# Patient Record
(demographics unavailable — no encounter records)

---

## 2024-10-23 NOTE — HISTORY OF PRESENT ILLNESS
[de-identified] : 38 year old F with PMH anxiety, food allergy, and HLD presents for CPE. Pt denies CP/SOB, fever/chills, n/v/d/c.

## 2024-10-23 NOTE — PLAN
[FreeTextEntry1] : Routine blood work drawn in office and urine collected today. Declines flu. Refill meds. Refer to orthopedics. Pt advised to sign up for [facility] portal to review labs and communicate any questions or concerns directly. Yearly physical and return as needed for illness, medication refills, and new or existing complaints.

## 2024-10-23 NOTE — HEALTH RISK ASSESSMENT
[Good] : ~his/her~  mood as  good [Yes] : Yes [Monthly or less (1 pt)] : Monthly or less (1 point) [1 or 2 (0 pts)] : 1 or 2 (0 points) [Never (0 pts)] : Never (0 points) [No] : In the past 12 months have you used drugs other than those required for medical reasons? No [0] : 2) Feeling down, depressed, or hopeless: Not at all (0) [PHQ-2 Negative - No further assessment needed] : PHQ-2 Negative - No further assessment needed [Never] : Never [NO] : No [Patient reported PAP Smear was normal] : Patient reported PAP Smear was normal [None] : None [With Family] : lives with family [] :  [# Of Children ___] : has [unfilled] children [Audit-CScore] : 1 [[id number]] : 0

## 2024-10-30 NOTE — HISTORY OF PRESENT ILLNESS
[de-identified] : 38 F, PMH GERD (omeprazole) presents with 1 year of left sided pain along left iliac crest She states that this stareted around the time that she started a new workout class of dance workout, she attributed this pain to this however after stopping class pain did not resolve Pain is intermittent but occurs most days, pain is worse at night and with lying in certain positions also when bending her trunk to the right side This is her first evaluation for this She does not take any pain medication for this typically

## 2024-10-30 NOTE — DISCUSSION/SUMMARY
[de-identified] : Patient was seen today for evaluation management of 1 year of left hip/groin pain.  Patient has no pain with palpation, no reproducible pain with muscle testing, it is unclear what is exactly the etiology of his chronic intermittent pain.  Patient may be having some hip flexor strain/tendinitis, however there is no weakness and no specific reproduction of pain.  Patient was advised that this could potentially be something more intra-abdominal or within the pelvis, she has history of  section 4 years ago, but has regular follow-up with her GYN, she recently had full GYN annual exam with associated ultrasound as she did complain of this pain, she was told everything was normal.  At this time recommend a course of physical therapy 2 times per week for 4 weeks to see if we can make some interval improvement in her condition.  If no improvement may also consider GI consultation at that time for possible GI etiology within the lower abdomen/pelvis.  If patient has persisting pain may consider MRI at that time, but at this time patient has no evidence of any structural abnormality, certainly no surgical indications, recommend deferral of imaging at this time.  Follow up as needed.  Patient appreciates and agrees with current plan.  This note was generated using dragon medical dictation software.  A reasonable effort has been made for proofreading its contents, but typos may still remain.  If there are any questions or points of clarification needed please notify my office.

## 2024-10-30 NOTE — PHYSICAL EXAM
[de-identified] : Constitutional: Well-nourished, well-developed, No acute distress Respiratory:  Good respiratory effort, no SOB Lymphatic: No regional lymphadenopathy, no lymphedema Psychiatric: Pleasant and normal affect, alert and oriented x3 Musculoskeletal: normal except where as noted in regional exam  Left hip:  APPEARANCE: no marked deformities, no swelling or malalignment POSITIVE TENDERNESS: none NONTENDER: greater trochanter, TFL, gluteal region, ischium/proximal hamstring region, hip flexor region, sartorius and pubic symphysis.  ROM: full & painless.  RESISTIVE TESTING: painless resisted flex/ext, ER/IR/SLR/abduction/adduction.  SPECIAL TESTS: neg LALA/FADIR, painless loaded flexion & scouring  [de-identified] :  The following radiographs were ordered and read by me during this patient's visit. I reviewed each radiograph in detail with the patient and discussed the findings as highlighted below.   3 views of the left hip were obtained today that show no fracture, or dislocation. There are no degenerative changes seen. There is no malalignment. No obvious osseous abnormality. Otherwise unremarkable.

## 2024-10-30 NOTE — CONSULT LETTER
[Dear  ___] : Dear  [unfilled], [Consult Letter:] : I had the pleasure of evaluating your patient, [unfilled]. [Please see my note below.] : Please see my note below. [Consult Closing:] : Thank you very much for allowing me to participate in the care of this patient.  If you have any questions, please do not hesitate to contact me. [Sincerely,] : Sincerely, [FreeTextEntry2] : Dr. Jono Hernández [FreeTextEntry3] : Dr. Omar Kolb

## 2025-03-14 NOTE — PHYSICAL EXAM
[de-identified] : General Appearance / Station: Well developed, well nourished, in no acute distress Orientation: Oriented to person, place, and time Gait & Station: Ambulates without assistive device Neurologic: Normal leg sensation Cardiovascular: Warm extremity Lymphatics: No lymphedema Generalized Ligament Laxity: Normal Stiffness: Normal.  LUMBAR SPINE Nontender at lumbar spine Straight leg raise: Negative Motor: 5/5 motor L2-S1 Sensation Intact. No paresthesias L2-S1  SYMPTOMATIC LEFT HIP Range of motion: PAINFUL internal and external rotation of the hip. Strength: Within Normal Limits. Negative Trendelenburg sign                                                                      FADIR: POSITIVE Stinchfield: POSITIVE, with groin pain Palpation: TENDER at greater trochanter, Nontender SI joint                    LEFT KNEE Alignment: Varus Skin: Normal.  Effusion: None Quadriceps: Normal Range of motion: Normal PF crepitus: 1+ PF apprehension: None Patella / Patella Tendon: None Palpation: Nontender Meniscus signs: Negative  ASYMPTOMATIC RIGHT HIP Range of motion: Painless internal and external rotation of the hip. Skin: Normal Strength: Within normal limits LALA: Negative FADIR: Negative Stinchfield: Negative Palpation: Nontender at greater trochanter, Nontender at SI joint [de-identified] : Previous x-rays are reviewed which show no signs of bony abnormality.  CT scan of the abdomen pelvis also reviewed with normal-appearing left hip.

## 2025-03-14 NOTE — HISTORY OF PRESENT ILLNESS
[de-identified] : Patient presents for evaluation of left hip pain for the past 2 years with no associated injury.  She has undergone x-rays and CT scan with no acute pathology.  She has also undergone ultrasound evaluation to rule out gynecologic and abdominal causes for her symptoms however they have been unremarkable except for some mild reactive lymph nodes.  She continues to have pain that is 4-5 out of 10 in severity located in the groin area.

## 2025-03-14 NOTE — DISCUSSION/SUMMARY
[de-identified] : TEODORA PHIPPS  is an 38 year--old female who presents to the clinic with 2 years of left hip pain. . Exercise therapy has provided no relief.  Radiographic findings show no obvious fracture or deformity,however patient has had continued pain and is unable to return to their  usual activities. Given the history along with the physical exam findings of left hip intraarticular pain, pain with resisted hip flexion, pain with flexion, adduction and internal rotation I am concerned about a possible labral tear. I discussed that radiographs show the bones well but do not show the soft tissues, such as ligaments, tendons and labrum well. At this point, the patient is quite debilitated by her  pain and is unable to return to her  activities of daily living such as []. Given the persistent symptoms, I discussed with the patient that they should complete a structured home exercise protocol and avoid strenuous activities while we obtain an MRI to further evaluate for an internal derangement of the hip. The office will work to obtain the MRI.   I discussed with them that I often prescribe an anti-inflammatory that should be taken once a day with meals to decrease pain and expedite symptom relief. They should not take this while also taking Aleve (Naprosyn), Motrin/ Advil (Ibuprofen), Toradol (ketoralac). They must stop taking it if they develops stomach pain, increased bleeding or bruising and they should follow-up with their primary care doctor for routine blood work including kidney function to monitor its effect. While it Is not a habit-forming substance, it should only  be taken as needed and to discontinue use once symptoms have resolved.  They will continue with her over-the-counter medications that   In the interim, the patient should continue to walk and perform the structured home exercises in the packet and take anti-inflammatory medications as directed if not medically contraindicated. They will schedule follow-up for in person review of the MRI results. They know to call the office or present to the ER if they develop worsening pain, swelling, numbness, tingling, inability to use the leg.   My cumulative time spent on this patients visit included: Preparation for the visit, review of the medical records, review of pertinent diagnostic studies, examination and counseling of the patient on the above diagnosis, treatment plan and prognosis, orders of diagnostic tests, medications and/or appropriate procedures and documentation in the medical records of todays visit.

## 2025-05-16 NOTE — DISCUSSION/SUMMARY
[de-identified] : I discussed nonoperative treatment options for their nondisplaced left labral tear. We discussed nonoperative treatments options including activity modification, therapy, bracing, nonsteroidal anti-inflammatory medications, and injections.  They would like to get an additional opinion before proceeding with possible physical therapy and I will have him see my joint preservation colleague Dr. Sarkar for further evaluation.   I discussed with them that I often prescribe an anti-inflammatory that should be taken once a day with meals to decrease pain and expedite symptom relief. They should not take this while also taking Aleve (Naprosyn), Motrin/ Advil (Ibuprofen), Toradol (ketoralac). They must stop taking it if they develops stomach pain, increased bleeding or bruising and they should follow-up with their primary care doctor for routine blood work including kidney function to monitor its effect. While it Is not a habit-forming substance, it should only  be taken as needed and to discontinue use once symptoms have resolved.  Prefer to continue with over-the-counter medication   My cumulative time spent on this patients visit included: Preparation for the visit, review of the medical records, review of pertinent diagnostic studies, examination and counseling of the patient on the above diagnosis, treatment plan and prognosis, orders of diagnostic tests, medications and/or appropriate procedures and documentation in the medical records of todays visit.

## 2025-05-16 NOTE — HISTORY OF PRESENT ILLNESS
[de-identified] : Presents for follow-up of left hip pain.  Symptoms have persisted and are 4 out of 10 in severity and have been going on for over 2 years at this point but have worsened recently.  She continues to have groin pain.  She does follow-up with a OB/GYN for known ovarian cysts.

## 2025-05-16 NOTE — PHYSICAL EXAM
[de-identified] : General Appearance / Station: Well developed, well nourished, in no acute distress Orientation: Oriented to person, place, and time Gait & Station: Ambulates without assistive device Neurologic: Normal leg sensation Cardiovascular: Warm extremity Lymphatics: No lymphedema Generalized Ligament Laxity: Normal Stiffness: Normal.   SYMPTOMATIC LEFT HIP Range of motion: PAINFUL internal and external rotation of the hip. Strength: Within Normal Limits. Negative Trendelenburg sign                                                                      FADIR: POSITIVE Stinchfield: POSITIVE, with groin pain Palpation: TENDER at greater trochanter, Nontender SI joint                    LEFT KNEE Alignment: Varus Skin: Normal.  Effusion: None Quadriceps: Normal Range of motion: Normal PF crepitus: 1+ PF apprehension: None Patella / Patella Tendon: None Palpation: Nontender Meniscus signs: Negative  ASYMPTOMATIC RIGHT HIP Range of motion: Painless internal and external rotation of the hip. Skin: Normal Strength: Within normal limits LALA: Negative FADIR: Negative Stinchfield: Negative Palpation: Nontender at greater trochanter, Nontender at SI joint [de-identified] : EXAM: [id number] - MR HIP LT  - ORDERED BY: CATHIE FAYE   PROCEDURE DATE:  05/08/2025    INTERPRETATION:  History: Left hip pain.  Technique: Multiplanar and multisequence MRI images were obtained through the left hip without contrast.  Comparison: None available  Findings:   images show partially imaged T2 hyperintense lesion within the region of the right ovary/adnexa, measuring at least 2.2 cm.  OSSEOUS STRUCTURES: No acute fracture.  JOINT SPACE(S): Small field of view images of the left hip show no joint effusion. Articular cartilage is intact.  LABRUM: Small field of view images of the left hip show  nondisplaced tear of the anterior/anterosuperior acetabular labrum.  SOFT TISSUES: Left iliopsoas insertion, left rectus femoris origin, and left hamstring tendon origin are intact. Mild tendinosis of the left gluteus minimus/medius insertions. Trace left greater trochanter bursal fluid.  Neurovascular structures are unremarkable.  Impression:  Nondisplaced tear of the left anterior/anterosuperior acetabular labrum.  Mild tendinosis of the left gluteus minimus/medius insertions. Trace left greater trochanter bursal fluid.   images show a partially imaged indeterminate T2 hyperintense lesion within the region of the right ovary/adnexa. Recommend further evaluation with pelvic ultrasound for further characterization.  COMMUNICATION: Findings were sent via "[employer]" email to Dr. Faye at the time of this dictation.  --- End of Report --